# Patient Record
Sex: MALE | Race: WHITE | NOT HISPANIC OR LATINO | Employment: OTHER | URBAN - METROPOLITAN AREA
[De-identification: names, ages, dates, MRNs, and addresses within clinical notes are randomized per-mention and may not be internally consistent; named-entity substitution may affect disease eponyms.]

---

## 2019-07-30 ENCOUNTER — APPOINTMENT (OUTPATIENT)
Dept: CT IMAGING | Facility: CLINIC | Age: 84
End: 2019-07-30
Attending: EMERGENCY MEDICINE
Payer: MEDICARE

## 2019-07-30 ENCOUNTER — HOSPITAL ENCOUNTER (EMERGENCY)
Facility: CLINIC | Age: 84
Discharge: HOME | End: 2019-07-30
Attending: EMERGENCY MEDICINE | Admitting: EMERGENCY MEDICINE
Payer: MEDICARE

## 2019-07-30 VITALS
BODY MASS INDEX: 20.92 KG/M2 | SYSTOLIC BLOOD PRESSURE: 160 MMHG | HEIGHT: 68 IN | TEMPERATURE: 98.2 F | RESPIRATION RATE: 16 BRPM | OXYGEN SATURATION: 99 % | WEIGHT: 138 LBS | HEART RATE: 83 BPM | DIASTOLIC BLOOD PRESSURE: 84 MMHG

## 2019-07-30 DIAGNOSIS — S49.91XD INJURY OF RIGHT SHOULDER, SUBSEQUENT ENCOUNTER: ICD-10-CM
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
## 2019-07-30 DIAGNOSIS — S06.5XAA BILATERAL SUBDURAL HEMATOMAS (H): ICD-10-CM

## 2019-07-30 DIAGNOSIS — S00.01XA ABRASION OF SCALP, INITIAL ENCOUNTER: ICD-10-CM

## 2019-07-30 PROCEDURE — 99284 EMERGENCY DEPT VISIT MOD MDM: CPT | Mod: 25

## 2019-07-30 PROCEDURE — 70450 CT HEAD/BRAIN W/O DYE: CPT

## 2019-07-30 ASSESSMENT — ENCOUNTER SYMPTOMS
WEAKNESS: 0
CONFUSION: 0
HEADACHES: 1

## 2019-07-30 ASSESSMENT — MIFFLIN-ST. JEOR: SCORE: 1260.46

## 2019-07-30 NOTE — ED TRIAGE NOTES
"Daughter of pt came up to desk and asked if pt \"was going to get seen today\"  When this nurse was trying to talk to daughter she proceeded to states she was going to call an ambulance and go to [facility] if he was not going to be seen today repetitively she was going to call the ambulance.  Daughter iformed that pt was going to be seen today.  Daughter asked for an ice pack and this nurse stated I would get her one and then she repeated can I get an ice pack.  "

## 2019-07-30 NOTE — ED AVS SNAPSHOT
Emergency Department  [address] [place] [address]  Phone:  [phone]  Fax:  [phone]                                    Jaiden Alexis   MRN: [id number]    Department:   Emergency Department   Date of Visit:  7/30/2019           After Visit Summary Signature Page    I have received my discharge instructions, and my questions have been answered. I have discussed any challenges I see with this plan with the nurse or doctor.    ..........................................................................................................................................  Patient/Patient Representative Signature      ..........................................................................................................................................  Patient Representative Print Name and Relationship to Patient    ..................................................               ................................................  Date                                   Time    ..........................................................................................................................................  Reviewed by Signature/Title    ...................................................              ..............................................  Date                                               Time          22EPIC Rev 08/18

## 2019-07-30 NOTE — ED PROVIDER NOTES
History     Chief Complaint:  Fall     The history is provided by the patient and a relative.      Jaiden Alexis is a 89 year old male status post craniotomy in 2008 following a right sided subdural who presents with fall. The daughter states that the patient had a fall yesterday where he fell while working on his cement steps. The patient sustained an abrasion to his right forehead though he denies loss of consciousness, and also injured his right shoulder at that time. The patient was evaluated at the [facility] urgent care yesterday. He received an XR Shoulder as noted below and his head abrasion was cleaned and dressed. He was also instructed to take Aleve though he has not done so yet. The patient then followed up with [facility] today, but upon arrival when they heard about the head injury [facility] sent the patient to the ED to be evaluated. The daughter states that the patient will return to follow up with [facility] later today for more detailed evaluation of his shoulder. Here in the ED, the patient notes a headache and ongoing right shoulder pain. He denies new focal weakness in his arms or legs, confusion, or dental pain.   He is not on blood thinners and in fact takes no medications whatsoever.  Family would like him to have head imaging and then they would like to be discharged promptly so he can go back to [facility] to resume his orthopedic shoulder evaluation.    XR Shoulder Right   Generalized demineralization. No fracture or aggressive bone lesion. Normal glenohumeral and acromioclavicular joint alignment. Moderate glenohumeral osteoarthrosis. Moderate acromioclavicular osteoarthrosis. Unremarkable right chest wall. Normal soft tissues.  [facility]    Allergies:  Mold  House dust  Homeopathic products    Medications:    No daily medication.    Past Medical History:    Allergic rhinitis   Cataract  Constipation  Essential hypertension  Hyperlipidemia  Psoriasis   Subdural hemorrhage   hematuria  Herpes Zoster  "    Past Surgical History:    Appendectomy open  Tonsillectomy   Adenoidectomy  Twist drill hole subdural     Family History:   History reviewed. No pertinent family history.    Social History:  The patient was accompanied to the ED by daughter.  Smoking Status: former smoker  Alcohol Use: Yes  Drug Use: No  Marital Status:        Review of Systems   HENT: Negative for dental problem.    Musculoskeletal:        Right shoulder pain   Neurological: Positive for headaches. Negative for syncope and weakness.   Psychiatric/Behavioral: Negative for confusion.   All other systems reviewed and are negative.    Physical Exam     Patient Vitals for the past 24 hrs:   BP Temp Temp src Pulse Resp SpO2 Height Weight   07/30/19 1621 (!) 160/84 -- -- -- -- -- -- --   07/30/19 1616 (!) 160/84 -- -- -- 16 99 % -- --   07/30/19 1316 136/78 98.2  F (36.8  C) Temporal 83 16 97 % 1.727 m (5' 8\") 62.6 kg (138 lb)     Physical Exam  General: Nontoxic-appearing male sitting upright in room 28, family at bedside  HENT: face nontender with full painless ROM mandible, no bony deformity, OP clear, no difficulty controlling secretions, no hemotympanum, mild right-sided skull tenderness, skull deformity on the right consistent with prior craniotomy  Eyes: pupils round and reactive, no nystagmus, no raccoon eyes  CV: regular rate  Resp: CTAB, normal effort  GI: abdomen soft and nontender  MSK:  Cervical spine: no midline tenderness, FROM  Extremities: Moderate tenderness to right shoulder, not examined in detail as patient wished to defer this to the orthopedist that he will be seen immediately upon ED discharge  Skin:   Superficial abrasion and ecchymosis to right skull, no laceration or active bleeding  No mastoid ecchymosis.  Neuro: awake, alert, GCS 15, responds appropriately to commands, face symmetric,  normal, strength and sensation normal in all extremities, ambulatory  Psych: calm, cooperative      Emergency Department Course "   Imaging:  Radiology findings were communicated with the patient and daughter who voiced understanding of the findings.    CT Head w/o Contrast  1. No acute intracranial abnormality.  2. 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  NATHALIE PRO MD  Reading per radiology    Emergency Department Course:  Nursing notes and vitals reviewed.    1510 I performed an exam of the patient as documented above.     1534 The patient was sent for a CT Head while in the emergency department, results above.     1624 I spoke with Dr. Pro of the Radiology service regarding patient's presentation, findings, and plan of care.  He confirms that the subdural hemorrhage is seen on today's head CT are convincingly chronic.    1627 I returned to update the patient.     1634 I spoke with Tung GO of the Neuro Surgery service regarding patient's presentation, findings, and plan of care.  She states that the patient does not require repeat neuroimaging nor outpatient follow-up with the neurosurgery clinic unless his symptoms worsen.  She confirms there is no role for immediate surgery.    1640 Findings and plan explained to the Patient and daughter. Patient discharged home with instructions regarding supportive care, medications, and reasons to return. The importance of close follow-up was reviewed.     Impression & Plan    Medical Decision Making:  He describes a mechanical fall yesterday and is here for focused evaluation of his head, and given his age and history of subdural hematoma requiring surgery felt that CT imaging was necessary.  Interestingly, we did identify bilateral symmetric subdural hematomas, though these appear chronic and are thought to be unrelated to yesterday's fall.  This was discussed in detail with the patient and his family.  He is neurologically at his  baseline.  He declined any analgesics.  Neurosurgical team confirmed that there is no role for surgery, hospitalization, or even repeat imaging unless he develops symptoms such as worsening headache or focal neurologic signs or symptoms.  Patient was discharged with plan to go directly to [facility] for additional evaluation of the shoulder.  His wound to the scalp was cleansed and dressed by our tech.  Is welcome back for acute worsening at any hour.    Diagnosis:    ICD-10-CM    1. Fall, initial encounter W19.XXXA    2. Abrasion of scalp, initial encounter S00.01XA    3. Injury of right shoulder, subsequent encounter S49.91XD    4. Bilateral subdural hematomas (H) S06.5X9A     appear chronic      Disposition:   The patient is discharged to home.    Discharge Medications:  No discharge medication.     Scribe Disclosure:  IBella, am serving as a scribe at 4:24 PM on 7/30/2019 to document services personally performed by Zia Urrutia MD based on my observations and the provider's statements to me.   EMERGENCY DEPARTMENT    This record was created at least in part using electronic voice recognition software, so please excuse any typographical errors.     Zia Urrutia MD  07/31/19 3544

## 2020-06-30 PROBLEM — G89.29 CHRONIC RIGHT-SIDED LOW BACK PAIN WITHOUT SCIATICA: Status: ACTIVE | Noted: 2018-09-06

## 2020-06-30 PROBLEM — B02.9 HERPES ZOSTER WITHOUT COMPLICATION: Status: ACTIVE | Noted: 2018-08-30

## 2020-06-30 PROBLEM — M54.50 CHRONIC RIGHT-SIDED LOW BACK PAIN WITHOUT SCIATICA: Status: ACTIVE | Noted: 2018-09-06

## 2020-06-30 PROBLEM — M25.562 PAIN IN BOTH KNEES: Status: ACTIVE | Noted: 2018-09-06

## 2020-06-30 PROBLEM — R63.0 ANOREXIA: Status: ACTIVE | Noted: 2018-10-05

## 2020-06-30 PROBLEM — R31.9 HEMATURIA: Status: ACTIVE | Noted: 2018-08-30

## 2020-06-30 PROBLEM — M25.561 PAIN IN BOTH KNEES: Status: ACTIVE | Noted: 2018-09-06

## 2020-06-30 PROBLEM — R63.4 WEIGHT LOSS: Status: ACTIVE | Noted: 2018-08-30

## 2020-06-30 PROBLEM — E78.5 HYPERLIPIDEMIA: Status: ACTIVE | Noted: 2020-06-30

## 2020-06-30 PROBLEM — I48.0 PAROXYSMAL ATRIAL FIBRILLATION (H): Status: ACTIVE | Noted: 2019-09-10

## 2020-07-01 ENCOUNTER — OFFICE VISIT (OUTPATIENT)
Dept: PODIATRY | Facility: CLINIC | Age: 85
End: 2020-07-01
Payer: MEDICARE

## 2020-07-01 VITALS
BODY MASS INDEX: 22.5 KG/M2 | WEIGHT: 148 LBS | SYSTOLIC BLOOD PRESSURE: 160 MMHG | HEART RATE: 74 BPM | DIASTOLIC BLOOD PRESSURE: 69 MMHG

## 2020-07-01 DIAGNOSIS — L84 CORNS AND CALLOSITIES: Primary | ICD-10-CM

## 2020-07-01 PROCEDURE — 11056 PARNG/CUTG B9 HYPRKR LES 2-4: CPT | Mod: GA | Performed by: PODIATRIST

## 2020-07-01 RX ORDER — ACETAMINOPHEN 325 MG/1
650 TABLET ORAL
COMMUNITY
Start: 2019-09-16

## 2020-07-01 RX ORDER — BETAMETHASONE DIPROPIONATE 0.5 MG/G
LOTION TOPICAL
COMMUNITY

## 2020-07-01 RX ORDER — CEPHALEXIN 500 MG/1
CAPSULE ORAL
COMMUNITY

## 2020-07-01 RX ORDER — TRIAMCINOLONE ACETONIDE 1 MG/G
CREAM TOPICAL
COMMUNITY

## 2020-07-01 RX ORDER — GABAPENTIN 100 MG/1
CAPSULE ORAL
COMMUNITY

## 2020-07-01 RX ORDER — TAMSULOSIN HYDROCHLORIDE 0.4 MG/1
CAPSULE ORAL
COMMUNITY
Start: 2019-09-24

## 2020-07-01 RX ORDER — LEVETIRACETAM 500 MG/1
TABLET ORAL
COMMUNITY
Start: 2019-09-16

## 2020-07-01 NOTE — LETTER
7/1/2020         RE: Jaiden Alexis  [address] [place] [address]        Dear Colleague,    Thank you for referring your patient, Jaiden Alexis, to the [facility]. Please see a copy of my visit note below.    S:  Patient here for routine care and signed the ABN before seeing us today.    O:  On exam, There is a callus on the medial portion of right fourth toe and bilateral sub-first metatarsal heads.  Underlying tissue healthy.    A:    Calluses x3    P:  ABN signed.  Calluses debrided with a fifteen blade.  Discussed with patient that medicare will not pay for this service and that I do not do routine foot care in my practice unless patient at significant risk of limb loss.  Will refer to foot care service/nurse.           Elbert Wu DPM DPM, FACFAS          Again, thank you for allowing me to participate in the care of your patient.        Sincerely,        Elbert Wu DPM

## 2020-07-01 NOTE — PATIENT INSTRUCTIONS
We wish you continued good healing. If you have any questions or concerns, please do not hesitate to contact us at [phone]    Please remember to call and schedule a follow up appointment if one was recommended at your earliest convenience.   PODIATRY CLINIC HOURS  TELEPHONE NUMBER    Dr. Elbert Wu D.P.M [facility]    Clinics:  [facility]    Mariela Becerra [facility]   Tuesday 1PM-6PM  [place]  Wednesday 7AM-2PM  [facility]  Thursday 10AM-6PM  [place]  Friday 7AM-3PM  [place]  Specialty schedulers:   [phone] to make an appointment with any Specialty Provider.        Urgent Care locations:    [facility] Monday-Friday 5 pm - 9 pm. Saturday-Sunday 9 am -5pm    Monday-Friday 11 am - 9 pm Saturday 9 am - 5 pm     Monday-Sunday 12 noon-8PM [phone]     If you need a medication refill, please contact us you may need lab work and/or a follow up visit prior to your refill (i.e. Antifungal medications).    [facility] (secure e-mail communication and access to your chart) to send a message or to make an appointment.    If MRI needed please call [place] [facility] at [phone]

## 2020-07-01 NOTE — NURSING NOTE
Weight management plan: Patient was referred to their PCP to discuss a diet and exercise plan.     ABN signed    Care Everywhere consent obtained and sent to Earl Hwang to follow up with Primary Care provider regarding elevated blood pressure.

## 2020-07-01 NOTE — PROGRESS NOTES
S:  Patient here for routine care and signed the ABN before seeing us today.    O:  On exam, There is a callus on the medial portion of right fourth toe and bilateral sub-first metatarsal heads.  Underlying tissue healthy.    A:    Calluses x3    P:  ABN signed.  Calluses debrided with a fifteen blade.  Discussed with patient that medicare will not pay for this service and that I do not do routine foot care in my practice unless patient at significant risk of limb loss.  Will refer to foot care service/nurse.           Elbert Wu, ESTRELLA DPM, FACFAS

## 2020-08-05 ENCOUNTER — APPOINTMENT (OUTPATIENT)
Dept: URBAN - METROPOLITAN AREA CLINIC 252 | Age: 85
Setting detail: DERMATOLOGY
End: 2020-08-05

## 2020-08-05 VITALS — HEIGHT: 68 IN | RESPIRATION RATE: 14 BRPM | WEIGHT: 145 LBS

## 2020-08-05 DIAGNOSIS — L57.0 ACTINIC KERATOSIS: ICD-10-CM

## 2020-08-05 DIAGNOSIS — L82.1 OTHER SEBORRHEIC KERATOSIS: ICD-10-CM

## 2020-08-05 DIAGNOSIS — L20.89 OTHER ATOPIC DERMATITIS: ICD-10-CM

## 2020-08-05 PROBLEM — D48.5 NEOPLASM OF UNCERTAIN BEHAVIOR OF SKIN: Status: ACTIVE | Noted: 2020-08-05

## 2020-08-05 PROCEDURE — OTHER LIQUID NITROGEN (COSMETIC): OTHER

## 2020-08-05 PROCEDURE — 11102 TANGNTL BX SKIN SINGLE LES: CPT

## 2020-08-05 PROCEDURE — OTHER COUNSELING: OTHER

## 2020-08-05 PROCEDURE — 99203 OFFICE O/P NEW LOW 30 MIN: CPT | Mod: 25

## 2020-08-05 PROCEDURE — 17000 DESTRUCT PREMALG LESION: CPT | Mod: 59

## 2020-08-05 PROCEDURE — OTHER BIOPSY BY SHAVE METHOD: OTHER

## 2020-08-05 PROCEDURE — OTHER PRESCRIPTION: OTHER

## 2020-08-05 PROCEDURE — OTHER LIQUID NITROGEN: OTHER

## 2020-08-05 PROCEDURE — 17003 DESTRUCT PREMALG LES 2-14: CPT

## 2020-08-05 RX ORDER — BETAMETHASONE DIPROPIONATE 0.5 MG/G
0.05% CREAM TOPICAL BID
Qty: 1 | Refills: 5 | Status: ERX | COMMUNITY
Start: 2020-08-05

## 2020-08-05 ASSESSMENT — LOCATION DETAILED DESCRIPTION DERM
LOCATION DETAILED: RIGHT LATERAL UPPER BACK
LOCATION DETAILED: RIGHT LATERAL FOREHEAD
LOCATION DETAILED: RIGHT CENTRAL TEMPLE
LOCATION DETAILED: RIGHT POSTERIOR SHOULDER
LOCATION DETAILED: LEFT SUPERIOR FOREHEAD
LOCATION DETAILED: LEFT SUPERIOR TEMPLE
LOCATION DETAILED: RIGHT SUPERIOR FOREHEAD
LOCATION DETAILED: RIGHT INFERIOR LATERAL FOREHEAD
LOCATION DETAILED: LEFT CENTRAL ZYGOMA
LOCATION DETAILED: LEFT INFERIOR LATERAL MALAR CHEEK
LOCATION DETAILED: LEFT INFERIOR PREAURICULAR CHEEK
LOCATION DETAILED: LEFT CENTRAL PARIETAL SCALP
LOCATION DETAILED: LEFT SUPERIOR LATERAL UPPER BACK
LOCATION DETAILED: LEFT SUPERIOR PARIETAL SCALP

## 2020-08-05 ASSESSMENT — LOCATION ZONE DERM
LOCATION ZONE: FACE
LOCATION ZONE: SCALP
LOCATION ZONE: TRUNK
LOCATION ZONE: ARM
LOCATION ZONE: FACE

## 2020-08-05 ASSESSMENT — LOCATION SIMPLE DESCRIPTION DERM
LOCATION SIMPLE: LEFT CHEEK
LOCATION SIMPLE: LEFT UPPER BACK
LOCATION SIMPLE: RIGHT UPPER BACK
LOCATION SIMPLE: RIGHT TEMPLE
LOCATION SIMPLE: LEFT TEMPLE
LOCATION SIMPLE: LEFT FOREHEAD
LOCATION SIMPLE: LEFT ZYGOMA
LOCATION SIMPLE: LEFT CHEEK
LOCATION SIMPLE: SCALP
LOCATION SIMPLE: RIGHT FOREHEAD
LOCATION SIMPLE: RIGHT SHOULDER

## 2020-08-05 NOTE — HPI: RASH
How Severe Is Your Rash?: moderate
Is This A New Presentation, Or A Follow-Up?: Rash
Additional History: Has used several prescription in past and these help.

## 2020-08-05 NOTE — PROCEDURE: BIOPSY BY SHAVE METHOD
Biopsy Method: Dermablade
Depth Of Biopsy: dermis
Wound Care: Petrolatum
Biopsy Type: H and E
Billing Type: Third-Party Bill
Cryotherapy Text: The wound bed was treated with cryotherapy after the biopsy was performed.
Post-Care Instructions: WOUND CARE:\\nDo NOT submerge wound in a bath, swimming pool, or hot tub for at least 1 week or for as long as there is an open wound. Gently cleanse the site daily with mild soap and water. Be careful NOT to allow a forceful stream of water to hit the biopsy site. After cleaning/showering, apply a thin layer of petrolatum ointment or Aquaphor in the wound followed by an adhesive bandage. Continue this process daily until healed. \\n\\nBLEEDING:\\nIf you develop persistent bleeding, apply firm and steady pressure over the dressing with gauze for 15 minutes. If bleeding persists, reapply pressure with an ice pack over the gauze for 15 minutes. NEVER APPLY ICE DIRECTLY TO THE SKIN. Do NOT peek under the gauze during these 15 minutes of pressure.  Call our office at [phone] if you cannot get the bleeding to stop. \\n\\nINFECTION:\\nSigns of infection may include increasing rather than decreasing pain (after the first few days), increasing redness/swelling/heat, draining pus, pink/red streaks around the wound, and fever or chills.  Please call our office immediately at [phone] if infection is suspected. It is normal to have some mild redness on or around the wound edges; this will lighten day by day and will become less tender.\\n\\nPAIN:\\nPain is usually minimal, but if needed you may take acetaminophen (Tylenol) every four hours as needed. Applying an ice pack over the dressing for 15-20 minutes every 2-3 hours will relieve swelling, lessen pain, and help minimize bruising. NEVER APPLY ICE DIRECTLY TO THE SKIN. Avoid ibuprofen (Advil, Motrin) and naproxen (Aleve) for the first 48 hours as these can increase bleeding.\\n\\nSWELLIG AND BRUISING:\\nSwelling and bruising are common and temporary, usually lasting 1 - 2 weeks. It is more common in areas treated around the eyes, hands, and feet. In the days following the procedure, swelling and bruising can be minimized by keeping the affected areas elevated when possible, reducing salty foods, and applying ice packs over the dressing for 15-20 minutes every 2-3 hours. NEVER APPLY ICE DIRECTLY TO THE SKIN.\\n\\nITCHING:\\nItchiness on and around the wound is very common and can last several days to weeks after surgery. Mild itch is normal as the wound is healing. \\n\\nNERVE CHANGES:\\nNumbness is usually temporary, but it may last for several weeks to months. You may also experience sharp pains at the wound sight as it heals.  Mild pain is normal and will gradually improve with time.\\n \\nNO SMOKING:\\nSmoking will delay the healing process. If you smoke, we recommend trying to quit or at minimum reduce how much you smoke following a procedure.
Dressing: bandage
Hemostasis: Aluminum Chloride
Curettage Text: The wound bed was treated with curettage after the biopsy was performed.
X Size Of Lesion In Cm: 0
Electrodesiccation And Curettage Text: The wound bed was treated with electrodesiccation and curettage after the biopsy was performed.
Silver Nitrate Text: The wound bed was treated with silver nitrate after the biopsy was performed.
Notification Instructions: - It can take up to 2 weeks to get a biopsy result. \\n- Please refrain from calling to request results until after 2 weeks.
Anesthesia Volume In Cc (Will Not Render If 0): 0.5
Type Of Destruction Used: Electrodesiccation
Render Post-Care Instructions In Note?: no
Detail Level: Detailed
Anesthesia Type: 2% lidocaine with epinephrine
Was A Bandage Applied: Yes
Electrodesiccation Text: The wound bed was treated with electrodesiccation after the biopsy was performed.
Consent: - Verbal and written consent was obtained and risks were reviewed prior to procedure today. \\n- Risks discussed include but are not limited to scarring, infection, bleeding, scabbing, incomplete removal, nerve damage, pain, and allergy to anesthesia.

## 2020-08-05 NOTE — PROCEDURE: COUNSELING
Detail Level: Detailed
Detail Level: Simple
Patient Specific Counseling (Will Not Stick From Patient To Patient): - Advised that this lesion has features that suggest a skin cancer so a biopsy is necessary to confirm the diagnosis and guide treatment.\\n- Patient expressed understanding. \\n- If skin cancer is confirmed, further treatment will be necessary.

## 2020-08-05 NOTE — PROCEDURE: LIQUID NITROGEN (COSMETIC)
Render Post-Care Instructions In Note?: yes
Post-Care Instructions: - Patient was instructed to avoid picking at any of the treated lesions.
Consent: - Verbal and written consent was obtained, and risks were reviewed prior to procedure today. \\n- Risks discussed include but are not limited to pain, crusting, scabbing, blistering, scarring, temporary or permanent darker or lighter pigmentary change, recurrence, incomplete resolution, and infection. \\n- The patient understands that the procedure is cosmetic in nature and is not covered by or billable to insurance.
Billing Information: Bill by Static Price
Detail Level: Detailed
Price (Use Numbers Only, No Special Characters Or $): 50

## 2020-08-05 NOTE — PROCEDURE: LIQUID NITROGEN
Post-Care Instructions: - Patient was instructed to avoid picking at any of the treated lesions.
Render Post-Care Instructions In Note?: yes
Detail Level: Detailed
Consent: - Discussed that these are a result of cumulative sun exposure.\\n- Verbal and written consent was obtained, and risks were reviewed prior to procedure today. \\n- Risks discussed include but are not limited to pain, crusting, scabbing, blistering, scarring, temporary or permanent darker or lighter pigmentary change, recurrence, incomplete resolution, and infection.
Duration Of Freeze Thaw-Cycle (Seconds): 0

## 2020-08-05 NOTE — HPI: SKIN LESIONS
How Severe Is Your Skin Lesion?: moderate
Is This A New Presentation, Or A Follow-Up?: Growths
Have Your Skin Lesions Been Treated?: not been treated

## 2020-09-08 ENCOUNTER — APPOINTMENT (OUTPATIENT)
Dept: URBAN - METROPOLITAN AREA CLINIC 259 | Age: 85
Setting detail: DERMATOLOGY
End: 2020-09-09

## 2020-09-08 DIAGNOSIS — L57.8 OTHER SKIN CHANGES DUE TO CHRONIC EXPOSURE TO NONIONIZING RADIATION: ICD-10-CM

## 2020-09-08 PROBLEM — C44.319 BASAL CELL CARCINOMA OF SKIN OF OTHER PARTS OF FACE: Status: ACTIVE | Noted: 2020-09-08

## 2020-09-08 PROCEDURE — 99213 OFFICE O/P EST LOW 20 MIN: CPT | Mod: 25

## 2020-09-08 PROCEDURE — 17312 MOHS ADDL STAGE: CPT

## 2020-09-08 PROCEDURE — 13132 CMPLX RPR F/C/C/M/N/AX/G/H/F: CPT

## 2020-09-08 PROCEDURE — OTHER MOHS BY LAYER: OTHER

## 2020-09-08 PROCEDURE — OTHER COUNSELING: OTHER

## 2020-09-08 PROCEDURE — 17311 MOHS 1 STAGE H/N/HF/G: CPT

## 2020-09-08 ASSESSMENT — LOCATION ZONE DERM: LOCATION ZONE: TRUNK

## 2020-09-08 ASSESSMENT — LOCATION DETAILED DESCRIPTION DERM: LOCATION DETAILED: RIGHT MEDIAL UPPER BACK

## 2020-09-08 ASSESSMENT — LOCATION SIMPLE DESCRIPTION DERM: LOCATION SIMPLE: RIGHT UPPER BACK

## 2020-09-08 NOTE — PROCEDURE: MOHS BY LAYER
Stage 2: Defect X-Dimension: 1.7
Stage 1: Depth Of Layer: adipose tissue
Debridement Text: The wound edges were debrided prior to proceeding with the closure to facilitate wound healing.
Stage 2: Number Of Sections (Blocks) ?: 1
Stage 10: Number Of Sections (Blocks) ?: 0
Anesthesia Type: 1% lidocaine with epinephrine
Stage 1: Defect Y-Dimension: 0.8
Vermilion Border Text: The closure involved the vermilion border.
Detail Level: Detailed
Wound Care: Petrolatum
Repair Type: Complex Repair
Body Location Override (Optional - Billing Will Still Be Based On Selected Body Map Location If Applicable): Glabella
Complex Requirements: Extensive Undermining Performed?: Yes
Consent: The rationale for Mohs was explained to the patient and consent was obtained. The risks, benefits and alternatives to therapy were discussed in detail. Specifically, the risks of infection, scarring, bleeding, prolonged wound healing, incomplete removal, allergy to anesthesia, nerve injury and recurrence were addressed. Prior to the procedure, the treatment site was clearly identified and confirmed by the patient. All components of Universal Protocol/PAUSE Rule completed.
Retention Suture Text: Retention sutures were placed to support the closure and prevent dehiscence.
Complex Requirements: Retention Sutures?: No
Post-Care Instructions: I reviewed with the patient in detail post-care instructions. Patient is not to engage in any heavy lifting, exercise, or swimming for the next 14 days. Should the patient develop any fevers, chills, bleeding, severe pain patient will contact the office immediately.
Deep Sutures: 5-0 Vicryl
Undermining Type: Entire Wound
Number Of Stages: 2
Stage 1: Defect X-Dimension: 1.5
Helical Rim Text: The closure involved the helical rim.
Estimated Blood Loss (Cc): minimal
Simple / Intermediate / Complex Repair - Final Wound Length In Cm: 3
Hemostasis: Electrocautery
Suture Removal: 7 days
Nostril Rim Text: The closure involved the nostril rim.
Epidermal Sutures: 6-0 Nylon
Epidermal Closure: simple interrupted

## 2020-09-17 ENCOUNTER — APPOINTMENT (OUTPATIENT)
Dept: URBAN - METROPOLITAN AREA CLINIC 259 | Age: 85
Setting detail: DERMATOLOGY
End: 2020-09-17

## 2020-09-17 PROBLEM — C44.319 BASAL CELL CARCINOMA OF SKIN OF OTHER PARTS OF FACE: Status: ACTIVE | Noted: 2020-09-17

## 2020-09-17 PROCEDURE — OTHER SUTURE REMOVAL (GLOBAL PERIOD): OTHER

## 2020-09-17 NOTE — PROCEDURE: SUTURE REMOVAL (GLOBAL PERIOD)
Add 70196 Cpt? (Important Note: In 2017 The Use Of 70152 Is Being Tracked By Cms To Determine Future Global Period Reimbursement For Global Periods): no
Detail Level: Detailed

## 2020-09-25 ENCOUNTER — RX ONLY (RX ONLY)
Age: 85
End: 2020-09-25

## 2020-09-25 RX ORDER — BETAMETHASONE DIPROPIONATE 0.5 MG/ML
0.05% LOTION TOPICAL BID PRN
Qty: 1 | Refills: 5 | Status: ERX | COMMUNITY
Start: 2020-09-25

## 2021-06-29 ENCOUNTER — APPOINTMENT (OUTPATIENT)
Dept: URBAN - METROPOLITAN AREA CLINIC 259 | Age: 86
Setting detail: DERMATOLOGY
End: 2021-06-29

## 2021-06-29 DIAGNOSIS — L57.0 ACTINIC KERATOSIS: ICD-10-CM

## 2021-06-29 DIAGNOSIS — L82.0 INFLAMED SEBORRHEIC KERATOSIS: ICD-10-CM

## 2021-06-29 DIAGNOSIS — L21.8 OTHER SEBORRHEIC DERMATITIS: ICD-10-CM

## 2021-06-29 DIAGNOSIS — L57.8 OTHER SKIN CHANGES DUE TO CHRONIC EXPOSURE TO NONIONIZING RADIATION: ICD-10-CM

## 2021-06-29 PROCEDURE — 99214 OFFICE O/P EST MOD 30 MIN: CPT | Mod: 25

## 2021-06-29 PROCEDURE — 17110 DESTRUCT B9 LESION 1-14: CPT

## 2021-06-29 PROCEDURE — OTHER COUNSELING: OTHER

## 2021-06-29 PROCEDURE — 17003 DESTRUCT PREMALG LES 2-14: CPT | Mod: 59

## 2021-06-29 PROCEDURE — OTHER MIPS QUALITY: OTHER

## 2021-06-29 PROCEDURE — OTHER PRESCRIPTION MEDICATION MANAGEMENT: OTHER

## 2021-06-29 PROCEDURE — OTHER LIQUID NITROGEN: OTHER

## 2021-06-29 PROCEDURE — 17000 DESTRUCT PREMALG LESION: CPT | Mod: 59

## 2021-06-29 ASSESSMENT — LOCATION DETAILED DESCRIPTION DERM
LOCATION DETAILED: RIGHT ULNAR DORSAL HAND
LOCATION DETAILED: RIGHT MEDIAL FOREHEAD
LOCATION DETAILED: RIGHT SUPERIOR PARIETAL SCALP
LOCATION DETAILED: LEFT INFERIOR ANTERIOR NECK
LOCATION DETAILED: LEFT CYMBA CONCHA
LOCATION DETAILED: RIGHT CYMBA CONCHA
LOCATION DETAILED: SUPERIOR THORACIC SPINE

## 2021-06-29 ASSESSMENT — LOCATION ZONE DERM
LOCATION ZONE: HAND
LOCATION ZONE: TRUNK
LOCATION ZONE: SCALP
LOCATION ZONE: EAR
LOCATION ZONE: FACE
LOCATION ZONE: NECK

## 2021-06-29 ASSESSMENT — LOCATION SIMPLE DESCRIPTION DERM
LOCATION SIMPLE: SCALP
LOCATION SIMPLE: LEFT EAR
LOCATION SIMPLE: RIGHT HAND
LOCATION SIMPLE: UPPER BACK
LOCATION SIMPLE: LEFT ANTERIOR NECK
LOCATION SIMPLE: RIGHT EAR
LOCATION SIMPLE: RIGHT FOREHEAD

## 2021-06-29 NOTE — PROCEDURE: LIQUID NITROGEN
Render In Bullet Format When Appropriate: No
Detail Level: Zone
Consent: The patient's consent was obtained including but not limited to risks of crusting, scabbing, blistering, scarring, darker or lighter pigmentary change, recurrence, incomplete removal and infection.
Medical Necessity Information: It is in your best interest to select a reason for this procedure from the list below. All of these items fulfill various CMS LCD requirements except the new and changing color options.
Post-Care Instructions: I reviewed with the patient in detail post-care instructions. Patient is to wear sunprotection, and avoid picking at any of the treated lesions. Pt may apply Vaseline to crusted or scabbing areas.
Render Post Care In The Note?: yes
Total Number Of Aks Treated: 8
Medical Necessity Clause: This procedure was medically necessary because the lesions that were treated were:
Duration Of Freeze Thaw-Cycle (Seconds): 0
Total Number Of Lesions Treated: 2

## 2021-06-29 NOTE — PROCEDURE: PRESCRIPTION MEDICATION MANAGEMENT
Render In Strict Bullet Format?: No
Plan: Discussed Hydrocortisone/Ketoconazole patient declines today. Patient can call in for prescriptions as needed.
Detail Level: Zone

## 2021-06-29 NOTE — PROCEDURE: MIPS QUALITY
Detail Level: Detailed
Quality 226: Preventive Care And Screening: Tobacco Use: Screening And Cessation Intervention: Patient screened for tobacco use and is an ex/non-smoker
Quality 111:Pneumonia Vaccination Status For Older Adults: Pneumococcal Vaccination not Administered or Previously Received, Reason not Otherwise Specified
Quality 431: Preventive Care And Screening: Unhealthy Alcohol Use - Screening: Patient screened for unhealthy alcohol use using a single question and scores less than 2 times per year
Quality 130: Documentation Of Current Medications In The Medical Record: Current Medications Documented
Quality 110: Preventive Care And Screening: Influenza Immunization: Influenza Immunization Ordered or Recommended, but not Administered due to system reason

## 2022-07-11 ENCOUNTER — PATIENT OUTREACH (OUTPATIENT)
Dept: SURGERY | Facility: CLINIC | Age: 87
End: 2022-07-11

## 2022-07-11 ENCOUNTER — TRANSCRIBE ORDERS (OUTPATIENT)
Dept: OTHER | Age: 87
End: 2022-07-11

## 2022-07-11 DIAGNOSIS — C16.9 GASTRIC CANCER (H): Primary | ICD-10-CM

## 2022-07-13 NOTE — PROGRESS NOTES
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  He was originally diagnosed after presenting with an upper GI bleed.  EGD showed a non-bleeding gastric ulcer at the incisura that was biopsied and showed intestinal type moderately differentiated adenocarcinoma.  This was staged as T3N0 by EUS and subsequent staging via PET-CT has not shown evidence of distant metastatic disease.  The patient now presents to discuss recommendations on management.    Of Note: He has multiple medical issues including history of atrial fibrillation (on ASA), HTN, recurrent falls, prior head trauma, left acetabular fracture.  Recently discharged in May to TCU at [facility].  He is partially dependent and frail appearing.  No prior abdominal surgeries.    BP (!) 148/73   Pulse 61   Temp 97.4  F (36.3  C) (Oral)   Resp 18   Wt 63.7 kg (140 lb 6.4 oz)   SpO2 99%   BMI 21.35 kg/m      PET-CT (6/16/2022): IMPRESSION:   1.  Mild nodular uptake involving the body of the stomach. Correlate where the known gastric cancer is located. There is no adenopathy of the abdomen.   2.  Nonspecific right hilar lymph node uptake.   3.  Posttraumatic areas of uptake involving the left pubic rami as well as insufficiency fractures of the sacrum.     EGD/EUS (7/6/2022): IMPRESSION:   - Malignant gastric tumor at the incisura. Biopsied.   - A mass was found in the incisura of the stomach. A tissue diagnosis was obtained prior to this exam. This is consistent with adenocarcinoma. This was staged T3 (based on invasion into) N0 Mx by endosonographic criteria.     Pathology (7/6/2022): Moderately differentiated intestinal type adenocarcinoma.  HER2+ (3+ by IHC).    Assessment/Plan:  89 M w/ resectable distal gastric adenocarcinoma and no evidence of metastatic disease.  Curative intent treatment would require likely subtotal gastrectomy and lymphadenectomy (always possibility  for total gastrectomy) following completion of neoadjuvant systemic chemotherapy.  The patient has multiple medical problems and advanced age with diminished functional status.  We had a discussion regarding these issues as well as the cancer and their relation to treatment.  I discussed that I am greatly concerned that curative intent treatment would significantly diminish his life quality and has high risk of being counterproductive.  As such, I think the benefits of surgery are outweighed by the risks.  In having this discussion with the patient, we agreed that aggressive curative intent treatment including surgical resection is likely not in his best interest.  In fact, the patient is strongly considering no treatment.  I would agree with radiation oncology consultation given that may be a reasonable palliative treatment for a bleeding gastric cancer.  The patient will follow-up if further issues or questions arise.  Questions were answered and the patient was in agreement with and understanding of the plan.    A total of 45 minutes were spent on this encounter including more than 50% in face to face time and the remainder in imaging review, chart review, documentation, and coordination of care.

## 2022-07-13 NOTE — PROGRESS NOTES
New Patient Oncology Nurse Navigator Note     Referring provider: Self referral     Referring Clinic/Organization: Self Referred     Referred to: Surgical Oncology -  Hepatobiliary / GI Cancers  Medical Oncology      Requested provider (if applicable): First available provider    Referral Received: 07/12/22       Evaluation for : Gastric Cancer      Clinical History (per Nurse review of records provided):      See book marked documents:       Referring MD office note    Pathology report    Imaging reports     Procedure report     No results found for: , ALBUMIN, AST, ALT, ALKPHOS, BILITOTAL, LDPLT, WBC, LDRBC       Past Medical History:   Diagnosis Date     Allergic rhinitis     No Comments Provided     Cataract     No Comments Provided     Constipation     No Comments Provided     Essential hypertension     No Comments Provided     Fall     08/2008     Other and unspecified hyperlipidemia     No Comments Provided     Other psoriasis     2007     Subdural hemorrhage (H)     8/08       Past Surgical History:   Procedure Laterality Date     APPENDECTOMY OPEN      age 14     COLONOSCOPY      12/02, 5/07,also had BE, diverticulosis     COLONOSCOPY      6/6/12,diverticulosis and hemorrhoids     OTHER SURGICAL HISTORY      8/08,19703.0,[place] TWIST DRILL HOLE SUBDURAL/VENT PUNC     TONSILLECTOMY, ADENOIDECTOMY, COMBINED      age 14       Current Outpatient Medications   Medication Sig Dispense Refill     acetaminophen (TYLENOL) 325 MG tablet Take 650 mg by mouth       betamethasone dipropionate (DIPROSONE) 0.05 % external lotion betamethasone dipropionate 0.05 % lotion       cephALEXin (KEFLEX) 500 MG capsule cephalexin 500 mg capsule       gabapentin (NEURONTIN) 100 MG capsule gabapentin 100 mg capsule       levETIRAcetam (KEPPRA) 500 MG tablet        Multiple Vitamins-Minerals (EYE VITAMINS & MINERALS) TABS Take 1 tablet by mouth       tamsulosin (FLOMAX) 0.4 MG capsule        triamcinolone (KENALOG) 0.1 % external  cream triamcinolone acetonide 0.1 % topical cream             Allergies   Allergen Reactions     Dust Mite Extract      Molds & Smuts           Patient Active Problem List   Diagnosis     Weight loss     Subdural hematoma (H)     Pain in both knees     Hyperlipidemia     Herpes zoster without complication     Hematuria     Essential hypertension     Elevated blood pressure     Chronic right-sided low back pain without sciatica     Anorexia     Allergic rhinitis     Headache in back of head     Paroxysmal atrial fibrillation (H)         Clinical Assessment / Barriers to Care (Per Nurse):    None at this time.     Records Location:     [facility] Everywhere     Records Needed:     ABDOMINAL IMAGING (CT SCANS, MRI, US)  DATING BACK TO 2022      Additional testing needed prior to consult:     NONE AT THIS TIME    Referral updates and Plan:       Consult with Surgical Oncology   Medical Oncology Consult         Sharyn Soto, RN, BSN   Surgical Oncology New Patient Nurse Navigator  [facility]  [phone]

## 2022-07-14 NOTE — TELEPHONE ENCOUNTER
RECORDS STATUS - ALL OTHER DIAGNOSIS      Action    Action Taken 22  *THIS PRE VISIT WILL ALSO ACCOUNT FOR DR. OH APPT 22    Spoke w/ Anne @ [facility] Film Room - she will push CT shortly.     Spoke w/ Pt's Daughter, Shyann - Shyann advised she has legal POA over pt. Shyann advised all tx has been thru [facility], [facility], [facility] & [facility]. Shyann provided verbal authorization for [facility] pull.     Shyann advised pt has only had a PET & CT done for this concern.    Pt has not had any in office visits w/ [facility] - only procedures that were done @ [facility] & [facility] - all detailed below.  3:55 PM    Imaging from [facility] resolved to PACS  4:05 PM         RECORDS RECEIVED FROM: Sara Mancini, [facility]   DATE RECEIVED:    NOTES STATUS DETAILS   OFFICE NOTE from referring provider Self Notes in CE   OFFICE NOTE from medical oncologist CE - Live, [facility] Live Mcf[place]: 22    [facility]  Dr. Lyndon Alcala: 22   DISCHARGE SUMMARY from hospital CE - Live 22   DISCHARGE REPORT from the ER CE - Live 22   OPERATIVE REPORT CE - Live 22, 22: Endoscopy   MED[place] LIST [place]   LABS  Slides requested  for Dr. Oh appt.   PATHOLOGY REPORTS [facility] [employer]  [facility] [employer] [facility]  22: [id number]  22: [id number]    [facility]  22: [id number]   ANYTHING RELATED TO DIAGNOSIS Epic/CE 22   IMAGING (NEED IMAGES & REPORT)     CT SCANS PACS 22: Live   PET PACS 22: Sara

## 2022-07-15 ENCOUNTER — OFFICE VISIT (OUTPATIENT)
Dept: SURGERY | Facility: CLINIC | Age: 87
End: 2022-07-15
Attending: INTERNAL MEDICINE
Payer: MEDICARE

## 2022-07-15 ENCOUNTER — PRE VISIT (OUTPATIENT)
Dept: SURGERY | Facility: CLINIC | Age: 87
End: 2022-07-15

## 2022-07-15 VITALS
SYSTOLIC BLOOD PRESSURE: 148 MMHG | BODY MASS INDEX: 21.35 KG/M2 | RESPIRATION RATE: 18 BRPM | WEIGHT: 140.4 LBS | TEMPERATURE: 97.4 F | DIASTOLIC BLOOD PRESSURE: 73 MMHG | HEART RATE: 61 BPM | OXYGEN SATURATION: 99 %

## 2022-07-15 DIAGNOSIS — C16.3 MALIGNANT NEOPLASM OF PYLORIC ANTRUM (H): Primary | ICD-10-CM

## 2022-07-15 PROCEDURE — 99204 OFFICE O/P NEW MOD 45 MIN: CPT | Performed by: SURGERY

## 2022-07-15 PROCEDURE — G0463 HOSPITAL OUTPT CLINIC VISIT: HCPCS

## 2022-07-15 RX ORDER — PANTOPRAZOLE SODIUM 40 MG/1
40 TABLET, DELAYED RELEASE ORAL
COMMUNITY
Start: 2022-05-18

## 2022-07-15 ASSESSMENT — PAIN SCALES - GENERAL: PAINLEVEL: NO PAIN (0)

## 2022-07-15 NOTE — NURSING NOTE
"Oncology Rooming Note    July 15, 2022 8:21 AM   Jaiden Alexis is a 89 year old male who presents for:    Chief Complaint   Patient presents with     New Patient     Gastric cancer      Initial Vitals: BP (!) 148/73   Pulse 61   Temp 97.4  F (36.3  C) (Oral)   Resp 18   Wt 63.7 kg (140 lb 6.4 oz)   SpO2 99%   BMI 21.35 kg/m   Estimated body mass index is 21.35 kg/m  as calculated from the following:    Height as of 7/30/19: 1.727 m (5' 8\").    Weight as of this encounter: 63.7 kg (140 lb 6.4 oz). Body surface area is 1.75 meters squared.  No Pain (0) Comment: Data Unavailable   No LMP for male patient.  Allergies reviewed: Yes  Medications reviewed: Yes    Medications: Medication refills not needed today.  Pharmacy name entered into [employer] - It's what's in store: [id number] - [place], [place] - [address]    Clinical concerns: New patient       Mihaela Perdomo CMA            "

## 2022-07-20 NOTE — TELEPHONE ENCOUNTER
Action 2022 4:30 PM ABT   Action Taken Slides from [place] [id number] 22 received but returned back to [place] with no review. Appt with Dr. Ashraf  cancelled and per Dr. Light, provider doesn't need path slide review.  [employer]

## 2022-07-20 NOTE — TELEPHONE ENCOUNTER
Pathology slides arrived from [facility] appt with Dr. Ashraf was cancelled with no plans to reschedule.  Dr. Light did not need a path review on these slides.  Slides returned to [facility] via [employer] - tracking number: [id number].